# Patient Record
Sex: MALE | URBAN - METROPOLITAN AREA
[De-identification: names, ages, dates, MRNs, and addresses within clinical notes are randomized per-mention and may not be internally consistent; named-entity substitution may affect disease eponyms.]

---

## 2024-03-18 ENCOUNTER — APPOINTMENT (RX ONLY)
Dept: URBAN - METROPOLITAN AREA CLINIC 42 | Facility: CLINIC | Age: 29
Setting detail: DERMATOLOGY
End: 2024-03-18

## 2024-03-18 DIAGNOSIS — D49.2 NEOPLASM OF UNSPECIFIED BEHAVIOR OF BONE, SOFT TISSUE, AND SKIN: ICD-10-CM

## 2024-03-18 PROBLEM — D23.61 OTHER BENIGN NEOPLASM OF SKIN OF RIGHT UPPER LIMB, INCLUDING SHOULDER: Status: ACTIVE | Noted: 2024-03-18

## 2024-03-18 PROCEDURE — ? EXCISION

## 2024-03-18 PROCEDURE — ? DIAGNOSIS COMMENT

## 2024-03-18 PROCEDURE — ? COUNSELING

## 2024-03-18 PROCEDURE — ? PHOTO-DOCUMENTATION

## 2024-03-18 PROCEDURE — 99202 OFFICE O/P NEW SF 15 MIN: CPT | Mod: 25

## 2024-03-18 PROCEDURE — 11404 EXC TR-EXT B9+MARG 3.1-4 CM: CPT

## 2024-03-18 ASSESSMENT — LOCATION ZONE DERM: LOCATION ZONE: LEG

## 2024-03-18 ASSESSMENT — LOCATION SIMPLE DESCRIPTION DERM: LOCATION SIMPLE: LEFT POSTERIOR THIGH

## 2024-03-18 ASSESSMENT — LOCATION DETAILED DESCRIPTION DERM: LOCATION DETAILED: LEFT PROXIMAL POSTERIOR THIGH

## 2024-03-18 NOTE — PROCEDURE: EXCISION

## 2024-03-18 NOTE — PROCEDURE: DIAGNOSIS COMMENT
Comment: Patient and mother are unsure of how long the lesion has been present. \\n- Recommend excisional biopsy as documented below.
Detail Level: Simple
Render Risk Assessment In Note?: no
Comment: Congenital. \\n- Discussed laser hair removal for hypertrichosis. Patient declined at this time.

## 2024-04-03 ENCOUNTER — APPOINTMENT (RX ONLY)
Dept: URBAN - METROPOLITAN AREA CLINIC 42 | Facility: CLINIC | Age: 29
Setting detail: DERMATOLOGY
End: 2024-04-03

## 2024-04-03 DIAGNOSIS — Z48.02 ENCOUNTER FOR REMOVAL OF SUTURES: ICD-10-CM

## 2024-04-03 PROCEDURE — ? PHOTO-DOCUMENTATION

## 2024-04-03 PROCEDURE — ? SUTURE REMOVAL (GLOBAL PERIOD)

## 2024-04-03 ASSESSMENT — LOCATION SIMPLE DESCRIPTION DERM: LOCATION SIMPLE: LEFT POSTERIOR THIGH

## 2024-04-03 ASSESSMENT — LOCATION DETAILED DESCRIPTION DERM: LOCATION DETAILED: LEFT PROXIMAL POSTERIOR THIGH

## 2024-04-03 ASSESSMENT — LOCATION ZONE DERM: LOCATION ZONE: LEG

## 2024-04-03 NOTE — PROCEDURE: SUTURE REMOVAL (GLOBAL PERIOD)
Detail Level: Detailed
Add 54126 Cpt? (Important Note: In 2017 The Use Of 84189 Is Being Tracked By Cms To Determine Future Global Period Reimbursement For Global Periods): no